# Patient Record
Sex: FEMALE | HISPANIC OR LATINO
[De-identification: names, ages, dates, MRNs, and addresses within clinical notes are randomized per-mention and may not be internally consistent; named-entity substitution may affect disease eponyms.]

---

## 2020-07-13 ENCOUNTER — APPOINTMENT (OUTPATIENT)
Dept: INTERNAL MEDICINE | Facility: CLINIC | Age: 20
End: 2020-07-13

## 2020-08-10 ENCOUNTER — LABORATORY RESULT (OUTPATIENT)
Age: 20
End: 2020-08-10

## 2020-08-10 ENCOUNTER — APPOINTMENT (OUTPATIENT)
Dept: INTERNAL MEDICINE | Facility: CLINIC | Age: 20
End: 2020-08-10
Payer: COMMERCIAL

## 2020-08-10 VITALS
TEMPERATURE: 97.1 F | HEART RATE: 71 BPM | SYSTOLIC BLOOD PRESSURE: 129 MMHG | DIASTOLIC BLOOD PRESSURE: 83 MMHG | RESPIRATION RATE: 14 BRPM | OXYGEN SATURATION: 99 % | BODY MASS INDEX: 21.52 KG/M2 | WEIGHT: 123 LBS | HEIGHT: 63.5 IN

## 2020-08-10 DIAGNOSIS — F17.200 NICOTINE DEPENDENCE, UNSPECIFIED, UNCOMPLICATED: ICD-10-CM

## 2020-08-10 DIAGNOSIS — N63.0 UNSPECIFIED LUMP IN UNSPECIFIED BREAST: ICD-10-CM

## 2020-08-10 DIAGNOSIS — Z00.00 ENCOUNTER FOR GENERAL ADULT MEDICAL EXAMINATION W/OUT ABNORMAL FINDINGS: ICD-10-CM

## 2020-08-10 DIAGNOSIS — Z78.9 OTHER SPECIFIED HEALTH STATUS: ICD-10-CM

## 2020-08-10 DIAGNOSIS — Z87.440 PERSONAL HISTORY OF URINARY (TRACT) INFECTIONS: ICD-10-CM

## 2020-08-10 PROCEDURE — 99385 PREV VISIT NEW AGE 18-39: CPT

## 2020-08-10 RX ORDER — MULTIVITAMIN
TABLET ORAL
Refills: 0 | Status: ACTIVE | COMMUNITY

## 2020-08-10 RX ORDER — ETONOGESTREL AND ETHINYL ESTRADIOL .12; .015 MG/D; MG/D
INSERT, EXTENDED RELEASE VAGINAL
Refills: 0 | Status: ACTIVE | COMMUNITY

## 2020-08-10 NOTE — PHYSICAL EXAM
[No Acute Distress] : no acute distress [Well Developed] : well developed [Well Nourished] : well nourished [Well-Appearing] : well-appearing [PERRL] : pupils equal round and reactive to light [Normal Sclera/Conjunctiva] : normal sclera/conjunctiva [EOMI] : extraocular movements intact [No JVD] : no jugular venous distention [No Lymphadenopathy] : no lymphadenopathy [Supple] : supple [Thyroid Normal, No Nodules] : the thyroid was normal and there were no nodules present [No Respiratory Distress] : no respiratory distress  [No Accessory Muscle Use] : no accessory muscle use [Clear to Auscultation] : lungs were clear to auscultation bilaterally [Normal Rate] : normal rate  [Regular Rhythm] : with a regular rhythm [Normal S1, S2] : normal S1 and S2 [No Carotid Bruits] : no carotid bruits [No Murmur] : no murmur heard [No Abdominal Bruit] : a ~M bruit was not heard ~T in the abdomen [No Varicosities] : no varicosities [Pedal Pulses Present] : the pedal pulses are present [No Edema] : there was no peripheral edema [No Palpable Aorta] : no palpable aorta [No Extremity Clubbing/Cyanosis] : no extremity clubbing/cyanosis [Soft] : abdomen soft [Non Tender] : non-tender [Non-distended] : non-distended [No Masses] : no abdominal mass palpated [No HSM] : no HSM [Normal Bowel Sounds] : normal bowel sounds [Normal Posterior Cervical Nodes] : no posterior cervical lymphadenopathy [Normal Anterior Cervical Nodes] : no anterior cervical lymphadenopathy [No CVA Tenderness] : no CVA  tenderness [No Spinal Tenderness] : no spinal tenderness [No Joint Swelling] : no joint swelling [Grossly Normal Strength/Tone] : grossly normal strength/tone [No Rash] : no rash [Coordination Grossly Intact] : coordination grossly intact [No Focal Deficits] : no focal deficits [Normal Gait] : normal gait [Normal Affect] : the affect was normal [Normal Insight/Judgement] : insight and judgment were intact

## 2020-08-10 NOTE — HISTORY OF PRESENT ILLNESS
[de-identified] : PT COMES FOR INITIAL CPE\par LAST ONE 1 Y AGO \par HAD COLON- RECTAL SURGERY AT BIRTH IN [place]\par CC OF INTERMITTENT DYSURIA ,LAST EPISODE 2 DAYS AGO \par USED TO GET FREQUENT UTI 2 Y AGO

## 2020-08-10 NOTE — ASSESSMENT
[FreeTextEntry1] : INITIAL CPE OF 20 Y OLD FEM WITH INTERMITTENT DYSURIA AND HX OF UTI IN THE PAST = LABS ORDERED\par RTO 1 Y OR PRN

## 2020-08-11 LAB
25(OH)D3 SERPL-MCNC: 33.5 NG/ML
ALBUMIN SERPL ELPH-MCNC: 4.7 G/DL
ALP BLD-CCNC: 53 U/L
ALT SERPL-CCNC: 13 U/L
ANION GAP SERPL CALC-SCNC: 15 MMOL/L
APPEARANCE: ABNORMAL
AST SERPL-CCNC: 15 U/L
BASOPHILS # BLD AUTO: 0.03 K/UL
BASOPHILS NFR BLD AUTO: 0.4 %
BILIRUB SERPL-MCNC: 0.8 MG/DL
BILIRUBIN URINE: NEGATIVE
BLOOD URINE: NEGATIVE
BUN SERPL-MCNC: 11 MG/DL
CALCIUM SERPL-MCNC: 10.2 MG/DL
CHLORIDE SERPL-SCNC: 101 MMOL/L
CO2 SERPL-SCNC: 22 MMOL/L
COLOR: YELLOW
CREAT SERPL-MCNC: 0.9 MG/DL
EOSINOPHIL # BLD AUTO: 0.23 K/UL
EOSINOPHIL NFR BLD AUTO: 2.9 %
GLUCOSE QUALITATIVE U: NEGATIVE
GLUCOSE SERPL-MCNC: 85 MG/DL
HCT VFR BLD CALC: 43.1 %
HGB BLD-MCNC: 13.9 G/DL
IMM GRANULOCYTES NFR BLD AUTO: 0.1 %
KETONES URINE: NEGATIVE
LEUKOCYTE ESTERASE URINE: ABNORMAL
LYMPHOCYTES # BLD AUTO: 2.58 K/UL
LYMPHOCYTES NFR BLD AUTO: 32.2 %
MAN DIFF?: NORMAL
MCHC RBC-ENTMCNC: 30 PG
MCHC RBC-ENTMCNC: 32.3 GM/DL
MCV RBC AUTO: 93.1 FL
MONOCYTES # BLD AUTO: 0.28 K/UL
MONOCYTES NFR BLD AUTO: 3.5 %
NEUTROPHILS # BLD AUTO: 4.89 K/UL
NEUTROPHILS NFR BLD AUTO: 60.9 %
NITRITE URINE: POSITIVE
PH URINE: 6
PLATELET # BLD AUTO: 281 K/UL
POTASSIUM SERPL-SCNC: 4.4 MMOL/L
PROT SERPL-MCNC: 7.4 G/DL
PROTEIN URINE: NORMAL
RBC # BLD: 4.63 M/UL
RBC # FLD: 13.5 %
SODIUM SERPL-SCNC: 139 MMOL/L
SPECIFIC GRAVITY URINE: 1.02
TSH SERPL-ACNC: 1.31 UIU/ML
UROBILINOGEN URINE: NORMAL
WBC # FLD AUTO: 8.02 K/UL

## 2020-08-14 DIAGNOSIS — Z87.440 PERSONAL HISTORY OF URINARY (TRACT) INFECTIONS: ICD-10-CM

## 2020-08-14 LAB
CHOLEST SERPL-MCNC: 196 MG/DL
CHOLEST/HDLC SERPL: 1.8 RATIO
HDLC SERPL-MCNC: 109 MG/DL
LDLC SERPL CALC-MCNC: 66 MG/DL
TRIGL SERPL-MCNC: 105 MG/DL

## 2020-08-14 RX ORDER — NITROFURANTOIN MACROCRYSTALS 100 MG/1
100 CAPSULE ORAL
Qty: 14 | Refills: 0 | Status: ACTIVE | COMMUNITY
Start: 2020-08-14 | End: 1900-01-01

## 2020-12-23 PROBLEM — Z87.440 HISTORY OF URINARY TRACT INFECTION: Status: RESOLVED | Noted: 2020-08-14 | Resolved: 2020-12-23

## 2021-05-14 ENCOUNTER — APPOINTMENT (OUTPATIENT)
Dept: INTERNAL MEDICINE | Facility: CLINIC | Age: 21
End: 2021-05-14
Payer: COMMERCIAL

## 2021-05-14 ENCOUNTER — LABORATORY RESULT (OUTPATIENT)
Age: 21
End: 2021-05-14

## 2021-05-14 VITALS
BODY MASS INDEX: 20.65 KG/M2 | DIASTOLIC BLOOD PRESSURE: 79 MMHG | OXYGEN SATURATION: 100 % | SYSTOLIC BLOOD PRESSURE: 124 MMHG | RESPIRATION RATE: 14 BRPM | HEIGHT: 63.5 IN | WEIGHT: 118 LBS | TEMPERATURE: 97.8 F | HEART RATE: 91 BPM

## 2021-05-14 DIAGNOSIS — R30.0 DYSURIA: ICD-10-CM

## 2021-05-14 DIAGNOSIS — F17.290 NICOTINE DEPENDENCE, OTHER TOBACCO PRODUCT, UNCOMPLICATED: ICD-10-CM

## 2021-05-14 DIAGNOSIS — F41.1 GENERALIZED ANXIETY DISORDER: ICD-10-CM

## 2021-05-14 DIAGNOSIS — R63.4 ABNORMAL WEIGHT LOSS: ICD-10-CM

## 2021-05-14 PROCEDURE — 99072 ADDL SUPL MATRL&STAF TM PHE: CPT

## 2021-05-14 PROCEDURE — 99214 OFFICE O/P EST MOD 30 MIN: CPT

## 2021-05-14 NOTE — HISTORY OF PRESENT ILLNESS
[de-identified] : PT HAD UTI- UROSEPSIS IN [place] 2/21;LAST 2 DAYS MINOR DYSURIA,BETTER TODAY \par CC OF FEELING DEPRESSED AND SUSPECTING ADHD INATTENTIVE DISORDER THAT IS RESPONSIBLE FOR POOR COLLEGE PERFORMANCE LAST 6 MONTHS \par HAS BEEN HAVING DECREASED APPETITE AND LOOSING WT LAST FEW MONTHS AS WELL  \par HAD START E CIG LAST FEW MONTHS AND HAD START MARIHUANA USE LAST 2 WEEKS

## 2021-05-14 NOTE — ASSESSMENT
[FreeTextEntry1] : 21 Y OLD FEM WITH GREGORY,? ADHD= REFER TO SEE PSYCH \par WT LOSS= LABS \par S/P UTI 3 M AGO AND MILD DYSURIA FOR 2 DAYS= URINE CULTURE ORDERED

## 2021-05-14 NOTE — REVIEW OF SYSTEMS
[Recent Change In Weight] : ~T recent weight change [Negative] : Heme/Lymph [de-identified] : SEE HPI

## 2021-05-15 LAB
25(OH)D3 SERPL-MCNC: 29.1 NG/ML
ALBUMIN SERPL ELPH-MCNC: 5.1 G/DL
ALP BLD-CCNC: 81 U/L
ALT SERPL-CCNC: 12 U/L
ANION GAP SERPL CALC-SCNC: 11 MMOL/L
APPEARANCE: ABNORMAL
AST SERPL-CCNC: 17 U/L
BASOPHILS # BLD AUTO: 0.04 K/UL
BASOPHILS NFR BLD AUTO: 0.4 %
BILIRUB SERPL-MCNC: 0.7 MG/DL
BILIRUBIN URINE: NEGATIVE
BLOOD URINE: NEGATIVE
BUN SERPL-MCNC: 7 MG/DL
CALCIUM SERPL-MCNC: 10.4 MG/DL
CHLORIDE SERPL-SCNC: 102 MMOL/L
CO2 SERPL-SCNC: 24 MMOL/L
COLOR: NORMAL
CREAT SERPL-MCNC: 0.83 MG/DL
EOSINOPHIL # BLD AUTO: 0.12 K/UL
EOSINOPHIL NFR BLD AUTO: 1.3 %
GLUCOSE QUALITATIVE U: NEGATIVE
GLUCOSE SERPL-MCNC: 83 MG/DL
HCT VFR BLD CALC: 41.8 %
HGB BLD-MCNC: 13.8 G/DL
IMM GRANULOCYTES NFR BLD AUTO: 0.3 %
KETONES URINE: NEGATIVE
LEUKOCYTE ESTERASE URINE: NEGATIVE
LYMPHOCYTES # BLD AUTO: 2.94 K/UL
LYMPHOCYTES NFR BLD AUTO: 32.6 %
MAN DIFF?: NORMAL
MCHC RBC-ENTMCNC: 29.5 PG
MCHC RBC-ENTMCNC: 33 GM/DL
MCV RBC AUTO: 89.3 FL
MONOCYTES # BLD AUTO: 0.37 K/UL
MONOCYTES NFR BLD AUTO: 4.1 %
NEUTROPHILS # BLD AUTO: 5.53 K/UL
NEUTROPHILS NFR BLD AUTO: 61.3 %
NITRITE URINE: NEGATIVE
PH URINE: 6.5
PLATELET # BLD AUTO: 293 K/UL
POTASSIUM SERPL-SCNC: 4.7 MMOL/L
PROT SERPL-MCNC: 7.7 G/DL
PROTEIN URINE: NEGATIVE
RBC # BLD: 4.68 M/UL
RBC # FLD: 13.7 %
SODIUM SERPL-SCNC: 138 MMOL/L
SPECIFIC GRAVITY URINE: 1.01
TSH SERPL-ACNC: 1.08 UIU/ML
UROBILINOGEN URINE: NORMAL
WBC # FLD AUTO: 9.03 K/UL

## 2021-05-17 LAB
BACTERIA UR CULT: ABNORMAL
CHOLEST SERPL-MCNC: 200 MG/DL
HDLC SERPL-MCNC: 96 MG/DL
LDLC SERPL CALC-MCNC: 94 MG/DL
NONHDLC SERPL-MCNC: 104 MG/DL
TRIGL SERPL-MCNC: 47 MG/DL

## 2024-02-29 ENCOUNTER — ASOB RESULT (OUTPATIENT)
Age: 24
End: 2024-02-29

## 2024-02-29 ENCOUNTER — APPOINTMENT (OUTPATIENT)
Dept: OBGYN | Facility: CLINIC | Age: 24
End: 2024-02-29
Payer: COMMERCIAL

## 2024-02-29 PROCEDURE — 76830 TRANSVAGINAL US NON-OB: CPT

## 2024-07-16 ENCOUNTER — APPOINTMENT (OUTPATIENT)
Age: 24
End: 2024-07-16
Payer: COMMERCIAL

## 2024-07-16 VITALS
HEART RATE: 93 BPM | SYSTOLIC BLOOD PRESSURE: 120 MMHG | RESPIRATION RATE: 14 BRPM | DIASTOLIC BLOOD PRESSURE: 81 MMHG | TEMPERATURE: 98.3 F | HEIGHT: 63.5 IN | OXYGEN SATURATION: 98 % | WEIGHT: 121 LBS

## 2024-07-16 DIAGNOSIS — Z00.00 ENCOUNTER FOR GENERAL ADULT MEDICAL EXAMINATION W/OUT ABNORMAL FINDINGS: ICD-10-CM

## 2024-07-16 DIAGNOSIS — J06.9 ACUTE UPPER RESPIRATORY INFECTION, UNSPECIFIED: ICD-10-CM

## 2024-07-16 PROCEDURE — 99385 PREV VISIT NEW AGE 18-39: CPT

## 2024-07-16 PROCEDURE — 36415 COLL VENOUS BLD VENIPUNCTURE: CPT

## 2024-07-16 PROCEDURE — G0444 DEPRESSION SCREEN ANNUAL: CPT | Mod: 59

## 2024-07-17 LAB
ALBUMIN SERPL ELPH-MCNC: 4.8 G/DL
ALP BLD-CCNC: 67 U/L
ALT SERPL-CCNC: 9 U/L
ANION GAP SERPL CALC-SCNC: 13 MMOL/L
APPEARANCE: CLEAR
AST SERPL-CCNC: 14 U/L
BASOPHILS # BLD AUTO: 0.05 K/UL
BASOPHILS NFR BLD AUTO: 0.8 %
BILIRUB SERPL-MCNC: 0.6 MG/DL
BILIRUBIN URINE: NEGATIVE
BLOOD URINE: NEGATIVE
BUN SERPL-MCNC: 12 MG/DL
CALCIUM SERPL-MCNC: 9.9 MG/DL
CHLORIDE SERPL-SCNC: 103 MMOL/L
CHOLEST SERPL-MCNC: 209 MG/DL
CO2 SERPL-SCNC: 23 MMOL/L
COLOR: YELLOW
CREAT SERPL-MCNC: 0.93 MG/DL
CREAT SPEC-SCNC: 322 MG/DL
EGFR: 87 ML/MIN/1.73M2
EOSINOPHIL # BLD AUTO: 0.15 K/UL
EOSINOPHIL NFR BLD AUTO: 2.4 %
ESTIMATED AVERAGE GLUCOSE: 100 MG/DL
GLUCOSE QUALITATIVE U: NEGATIVE MG/DL
GLUCOSE SERPL-MCNC: 103 MG/DL
HBA1C MFR BLD HPLC: 5.1 %
HCT VFR BLD CALC: 43.8 %
HDLC SERPL-MCNC: 119 MG/DL
HGB BLD-MCNC: 14.3 G/DL
IMM GRANULOCYTES NFR BLD AUTO: 0.2 %
KETONES URINE: ABNORMAL MG/DL
LDLC SERPL CALC-MCNC: 80 MG/DL
LEUKOCYTE ESTERASE URINE: NEGATIVE
LYMPHOCYTES # BLD AUTO: 2.62 K/UL
LYMPHOCYTES NFR BLD AUTO: 42 %
MAN DIFF?: NORMAL
MCHC RBC-ENTMCNC: 29.3 PG
MCHC RBC-ENTMCNC: 32.6 GM/DL
MCV RBC AUTO: 89.8 FL
MICROALBUMIN 24H UR DL<=1MG/L-MCNC: 2.2 MG/DL
MICROALBUMIN/CREAT 24H UR-RTO: 7 MG/G
MONOCYTES # BLD AUTO: 0.31 K/UL
MONOCYTES NFR BLD AUTO: 5 %
NEUTROPHILS # BLD AUTO: 3.1 K/UL
NEUTROPHILS NFR BLD AUTO: 49.6 %
NITRITE URINE: NEGATIVE
NONHDLC SERPL-MCNC: 90 MG/DL
PH URINE: 6.5
PLATELET # BLD AUTO: 325 K/UL
POTASSIUM SERPL-SCNC: 4.3 MMOL/L
PROT SERPL-MCNC: 7.6 G/DL
PROTEIN URINE: NORMAL MG/DL
RBC # BLD: 4.88 M/UL
RBC # FLD: 13.1 %
SODIUM SERPL-SCNC: 138 MMOL/L
SPECIFIC GRAVITY URINE: 1.03
TRIGL SERPL-MCNC: 57 MG/DL
TSH SERPL-ACNC: 1.06 UIU/ML
UROBILINOGEN URINE: 1 MG/DL
WBC # FLD AUTO: 6.24 K/UL

## 2025-03-31 ENCOUNTER — APPOINTMENT (OUTPATIENT)
Facility: CLINIC | Age: 25
End: 2025-03-31

## 2025-03-31 ENCOUNTER — NON-APPOINTMENT (OUTPATIENT)
Age: 25
End: 2025-03-31

## 2025-03-31 VITALS — DIASTOLIC BLOOD PRESSURE: 78 MMHG | SYSTOLIC BLOOD PRESSURE: 123 MMHG

## 2025-03-31 DIAGNOSIS — Z83.438 FAMILY HISTORY OF OTHER DISORDER OF LIPOPROTEIN METABOLISM AND OTHER LIPIDEMIA: ICD-10-CM

## 2025-03-31 DIAGNOSIS — Z83.3 FAMILY HISTORY OF DIABETES MELLITUS: ICD-10-CM

## 2025-03-31 DIAGNOSIS — Q51.28 OTHER AND UNSPECIFIED DOUBLING OF UTERUS: ICD-10-CM

## 2025-03-31 LAB — HCG UR QL: NEGATIVE

## 2025-03-31 PROCEDURE — 99395 PREV VISIT EST AGE 18-39: CPT

## 2025-03-31 PROCEDURE — 81025 URINE PREGNANCY TEST: CPT

## 2025-03-31 PROCEDURE — 99459 PELVIC EXAMINATION: CPT | Mod: NC

## 2025-04-01 LAB
C TRACH RRNA SPEC QL NAA+PROBE: NOT DETECTED
HPV HIGH+LOW RISK DNA PNL CVX: NOT DETECTED
N GONORRHOEA RRNA SPEC QL NAA+PROBE: NOT DETECTED
SOURCE AMPLIFICATION: NORMAL

## 2025-04-04 LAB — CYTOLOGY CVX/VAG DOC THIN PREP: NORMAL
